# Patient Record
Sex: MALE | Race: WHITE | Employment: FULL TIME | URBAN - NONMETROPOLITAN AREA
[De-identification: names, ages, dates, MRNs, and addresses within clinical notes are randomized per-mention and may not be internally consistent; named-entity substitution may affect disease eponyms.]

---

## 2022-01-20 ENCOUNTER — HOSPITAL ENCOUNTER (EMERGENCY)
Age: 42
Discharge: HOME | End: 2022-01-20
Payer: MEDICAID

## 2022-01-20 VITALS
WEIGHT: 190 LBS | TEMPERATURE: 98.6 F | HEART RATE: 78 BPM | HEIGHT: 70 IN | OXYGEN SATURATION: 96 % | BODY MASS INDEX: 27.2 KG/M2 | RESPIRATION RATE: 16 BRPM | SYSTOLIC BLOOD PRESSURE: 119 MMHG | DIASTOLIC BLOOD PRESSURE: 74 MMHG

## 2022-01-20 DIAGNOSIS — Z20.822 ENCOUNTER FOR LABORATORY TESTING FOR COVID-19 VIRUS: Primary | ICD-10-CM

## 2022-01-20 LAB
INFLUENZA A: NOT DETECTED
INFLUENZA B: NOT DETECTED
SARS-COV-2 RNA, RT PCR: NOT DETECTED

## 2022-01-20 PROCEDURE — 99202 OFFICE O/P NEW SF 15 MIN: CPT | Performed by: NURSE PRACTITIONER

## 2022-01-20 PROCEDURE — 87636 SARSCOV2 & INF A&B AMP PRB: CPT

## 2022-01-20 PROCEDURE — 99203 OFFICE O/P NEW LOW 30 MIN: CPT

## 2022-01-20 ASSESSMENT — ENCOUNTER SYMPTOMS
COUGH: 0
VOMITING: 0
SORE THROAT: 0
SHORTNESS OF BREATH: 0
NAUSEA: 0
DIARRHEA: 0

## 2022-01-20 NOTE — ED PROVIDER NOTES
[facility]  Urgent Care Encounter       CHIEF COMPLAINT       Chief Complaint   Patient presents with    Covid Testing       Nurses Notes reviewed and I agree except as noted in the HPI. HISTORY OF PRESENT ILLNESS   Manny Beavers is a 42 y.o. male who presents requesting a COVID test.  Patient states that his work is requiring a test before he can return as they were exposed to a coworker who has been positive. He denies any symptoms at this time. The patient states that his last exposure would have been 2 days ago. The history is provided by the patient. REVIEW OF SYSTEMS     Review of Systems   Constitutional: Negative for chills and fever. HENT: Negative for congestion and sore throat. Respiratory: Negative for cough and shortness of breath. Cardiovascular: Negative for chest pain. Gastrointestinal: Negative for diarrhea, nausea and vomiting. Musculoskeletal: Negative for arthralgias and myalgias. Skin: Negative for rash. Allergic/Immunologic: Negative for environmental allergies. Neurological: Negative for headaches. PAST MEDICAL HISTORY   History reviewed. No pertinent past medical history. SURGICALHISTORY     Patient  has no past surgical history on file. CURRENT MEDICATIONS       Previous Medications    IBUPROFEN (IBU) 600 MG TABLET    Take 1 tablet by mouth every 6 hours as needed for Pain. NAPROXEN (NAPROSYN) 500 MG TABLET    Take 1 tablet by mouth 2 times daily. NAPROXEN SODIUM (ALEVE) 220 MG TABLET    Take 220 mg by mouth 2 times daily (with meals). PHENYLEPH-PROMETHAZINE-CODEINE (PHENERGAN VC W/CODEINE) 5-6.25-10 MG/5ML SYRP SYRUP    Take 5-10 mLs by mouth every 4 hours as needed. ALLERGIES     Patient is is allergic to pcn [penicillins]. Patients   There is no immunization history on file for this patient.     FAMILY HISTORY     Patient's family history includes Asthma in his brother; Heart Disease in his father and mother. SOCIAL HISTORY     Patient  reports that he has been smoking cigarettes. He has been smoking about 1.00 pack per day. He has never used smokeless tobacco. He reports that he does not drink alcohol and does not use drugs. PHYSICAL EXAM     ED TRIAGE VITALS  BP: 119/74, Temp: 98.6 °F (37 °C), Pulse: 78, Resp: 16, SpO2: 96 %,Estimated body mass index is 27.26 kg/m² as calculated from the following:    Height as of this encounter: 5' 10\" (1.778 m). Weight as of this encounter: 190 lb (86.2 kg). ,No LMP for male patient. Physical Exam    DIAGNOSTIC RESULTS     Labs:No results found for this visit on 01/20/22. IMAGING:    No orders to display         EKG:      URGENT CARE COURSE:     Vitals:    01/20/22 1357   BP: 119/74   Pulse: 78   Resp: 16   Temp: 98.6 °F (37 °C)   SpO2: 96%   Weight: 190 lb (86.2 kg)   Height: 5' 10\" (1.778 m)       Medications - No data to display         PROCEDURES:  None    FINAL IMPRESSION      1. Encounter for laboratory testing for COVID-19 virus          DISPOSITION/ PLAN       Patient was tested based on the known exposure to Covid although the patient is asymptomatic at this time. Did discuss with the patient that a negative result today would not necessarily clear them from any symptoms that may develop in the near future and that a test would likely need to be redone if symptoms do develop. Patient verbalizes understanding.       PATIENT REFERRED TO:  Jaison Bear MD  [address] [place] [id number]      DISCHARGE MEDICATIONS:  New Prescriptions    No medications on file       Discontinued Medications    No medications on file       Current Discharge Medication List          ZEFERINO Martinez CNP    (Please note that portions of this note were completed with a voice recognition program. Efforts were made to edit the dictations but occasionally words are mis-transcribed.)          ZEFERINO Martinez CNP  01/20/22 7345

## 2022-09-16 ENCOUNTER — HOSPITAL ENCOUNTER (OUTPATIENT)
Age: 42
Setting detail: SPECIMEN
Discharge: HOME | End: 2022-09-16

## 2022-09-16 LAB — HEPATITIS C ANTIBODY: NONREACTIVE

## 2022-09-19 LAB
HEPATITIS C RNA PCR QUANT: NOT DETECTED
SOURCE: NORMAL